# Patient Record
Sex: FEMALE | Race: WHITE
[De-identification: names, ages, dates, MRNs, and addresses within clinical notes are randomized per-mention and may not be internally consistent; named-entity substitution may affect disease eponyms.]

---

## 2018-12-04 ENCOUNTER — HOSPITAL ENCOUNTER (EMERGENCY)
Dept: HOSPITAL 31 - C.ER | Age: 68
Discharge: HOME | End: 2018-12-04
Payer: COMMERCIAL

## 2018-12-04 VITALS — HEART RATE: 98 BPM | SYSTOLIC BLOOD PRESSURE: 154 MMHG | TEMPERATURE: 98.9 F | DIASTOLIC BLOOD PRESSURE: 79 MMHG

## 2018-12-04 VITALS — OXYGEN SATURATION: 98 %

## 2018-12-04 VITALS — RESPIRATION RATE: 18 BRPM

## 2018-12-04 DIAGNOSIS — R19.00: Primary | ICD-10-CM

## 2018-12-04 DIAGNOSIS — Z87.442: ICD-10-CM

## 2018-12-04 DIAGNOSIS — R10.9: ICD-10-CM

## 2018-12-04 LAB
ANISOCYTOSIS BLD QL SMEAR: SLIGHT
BACTERIA #/AREA URNS HPF: (no result) /[HPF]
BASOPHILS # BLD AUTO: 0.1 K/UL (ref 0–0.2)
BASOPHILS NFR BLD: 1 % (ref 0–2)
BASOPHILS NFR BLD: 1 % (ref 0–2)
BILIRUB UR-MCNC: NEGATIVE MG/DL
BUN SERPL-MCNC: 15 MG/DL (ref 7–17)
CALCIUM SERPL-MCNC: 9.3 MG/DL (ref 8.6–10.4)
EOSINOPHIL # BLD AUTO: 0.3 K/UL (ref 0–0.7)
EOSINOPHIL NFR BLD: 3 % (ref 0–4)
ERYTHROCYTE [DISTWIDTH] IN BLOOD BY AUTOMATED COUNT: 15.6 % (ref 11.5–14.5)
GFR NON-AFRICAN AMERICAN: > 60
GLUCOSE UR STRIP-MCNC: NORMAL MG/DL
HGB BLD-MCNC: 11.7 G/DL (ref 11–16)
HYPOCHROMIC: SLIGHT
LEUKOCYTE ESTERASE UR-ACNC: (no result) LEU/UL
LG PLATELETS BLD QL SMEAR: PRESENT
LYMPHOCYTE: 8 % (ref 20–40)
LYMPHOCYTES # BLD AUTO: 1 K/UL (ref 1–4.3)
LYMPHOCYTES NFR BLD AUTO: 9.9 % (ref 20–40)
MCH RBC QN AUTO: 26.2 PG (ref 27–31)
MCHC RBC AUTO-ENTMCNC: 33.3 G/DL (ref 33–37)
MCV RBC AUTO: 78.6 FL (ref 81–99)
MONOCYTE: 7 % (ref 0–10)
MONOCYTES # BLD: 0.8 K/UL (ref 0–0.8)
MONOCYTES NFR BLD: 8.4 % (ref 0–10)
NEUTROPHILS # BLD: 7.8 K/UL (ref 1.8–7)
NEUTROPHILS NFR BLD AUTO: 77.7 % (ref 50–75)
NEUTROPHILS NFR BLD AUTO: 84 % (ref 50–75)
NRBC BLD AUTO-RTO: 0.1 % (ref 0–2)
OVALOCYTES BLD QL SMEAR: SLIGHT
PH UR STRIP: 5 [PH] (ref 5–8)
PLATELET # BLD EST: (no result) 10*3/UL
PLATELET # BLD: 425 K/UL (ref 130–400)
PMV BLD AUTO: 7.3 FL (ref 7.2–11.7)
POIKILOCYTOSIS BLD QL SMEAR: SLIGHT
PROT UR STRIP-MCNC: (no result) MG/DL
RBC # BLD AUTO: 4.45 MIL/UL (ref 3.8–5.2)
RBC # UR STRIP: (no result) /UL
SP GR UR STRIP: 1.02 (ref 1–1.03)
SQUAMOUS EPITHIAL: 4 /HPF (ref 0–5)
TOTAL CELLS COUNTED BLD: 100
UROBILINOGEN UR-MCNC: NORMAL MG/DL (ref 0.2–1)
WBC # BLD AUTO: 10.1 K/UL (ref 4.8–10.8)

## 2018-12-04 PROCEDURE — 74176 CT ABD & PELVIS W/O CONTRAST: CPT

## 2018-12-04 PROCEDURE — 96374 THER/PROPH/DIAG INJ IV PUSH: CPT

## 2018-12-04 PROCEDURE — 85025 COMPLETE CBC W/AUTO DIFF WBC: CPT

## 2018-12-04 PROCEDURE — 96361 HYDRATE IV INFUSION ADD-ON: CPT

## 2018-12-04 PROCEDURE — 80048 BASIC METABOLIC PNL TOTAL CA: CPT

## 2018-12-04 PROCEDURE — 99284 EMERGENCY DEPT VISIT MOD MDM: CPT

## 2018-12-04 PROCEDURE — 81001 URINALYSIS AUTO W/SCOPE: CPT

## 2018-12-04 NOTE — C.PDOC
History Of Present Illness


68-year-old female presents to the ED complaining of right flank pain since 

yesterday. She reports pain is similar to prior episode of kidney stones. Pain 

is described as constant and localized. She denies any dysuria, frequency, uri

nary incontinence, fever, or chills. Patient reports + nausea, but no vomiting.





Time Seen by Provider: 18 13:17


Chief Complaint (Nursing): Back Pain


History Per: Patient


History/Exam Limitations: no limitations


Onset/Duration Of Symptoms: Days


Current Symptoms Are (Timing): Still Present


Quality Of Discomfort: "Pain"


Associated Symptoms: Nausea


Alleviating Factors: None





Past Medical History


Reviewed: Historical Data, Nursing Documentation, Vital Signs





- Medical History


PMH: Kidney Stones


Surgical History: 


Family History: States: No Known Family Hx





Review Of Systems


Except As Marked, All Systems Reviewed And Found Negative.


Constitutional: Negative for: Fever, Chills


Gastrointestinal: Positive for: Nausea.  Negative for: Vomiting, Diarrhea


Genitourinary: Negative for: Dysuria, Frequency, Incontinence, Hematuria


Musculoskeletal: Positive for: Back Pain (right flank pain)


Neurological: Negative for: Weakness, Numbness, Incoordination





Physical Exam





- Physical Exam


Appears: Non-toxic, In Acute Distress (mild painful distress)


Skin: Warm, Dry


Head: Atraumatic, Normacephalic


Eye(s): bilateral: Normal Inspection, PERRL, EOMI


Oral Mucosa: Moist


Neck: Normal ROM


Chest: Symmetrical


Cardiovascular: Rhythm Regular, No Murmur


Respiratory: Normal Breath Sounds, No Accessory Muscle Use, Other (NARD)


Gastrointestinal/Abdominal: Soft, No Tenderness, No Distention, No Guarding, No 

Rebound


Back: No CVA Tenderness, No Vertebral Tenderness


Extremity: Bilateral: Atraumatic, Normal Color And Temperature, Normal ROM


Pulses: Left Radial: Normal, Right Radial: Normal


Neurological/Psych: Oriented x3, Normal Speech





ED Course And Treatment





- Laboratory Results


Result Diagrams: 


                                 18 13:22





                                 18 13:22


O2 Sat by Pulse Oximetry: 98 (RA)


Pulse Ox Interpretation: Normal





- CT Scan/US


  ** CT Abd/Pelvis


Other Rad Studies (CT/US): Read By Radiologist, Radiology Report Reviewed


CT/US Interpretation: Accession No. : [id number].  Patient Name / ID : 

CLIFFORD MALCOLM  / [id number].  Exam Date : 2018 13:57:36 ( Approved ).  Study 

Comment :  Sex / Age : F  / 68Y.  Creator : Velia Su MD.  Dictator :

Velia Su MD.   :  Approver : Velia Su MD.  

Approver2 :  Report Date : 2018 14:38:42.  My Comment :  

*************************************

**********************************************.  This report is currently 

processing and HAS NOT BEEN OFFICIALLY SIGNED BY THE PHYSICIAN - ESTIMATED TIME 

OF APPROVAL IS 2018 14:43.  PROCEDURE:  CT Abdomen and Pelvis without Oral

or IV contrast.  HISTORY:  R FLANK PAIN.  COMPARISON:  None available.  

TECHNIQUE:  Contiguous axial images of the abdomen and pelvis. No oral or IV 

contrast administered. Coronal and Sagittal reformats generated and reviewed.  

Radiation dose:  Total exam DLP = 424.29 mGy-cm.  This CT exam was performed 

using one or more of the following dose reduction techniques: Automated exposure

control, adjustment of the mA and/or kV according to patient size, and/or use of

iterative reconstruction technique.  FINDINGS:  There is limited evaluation of 

the solid organs without the administration of IV contrast.  LOWER THORAX:  No 

visible consolidation, pleural effusion, or pneumothorax.  10 mm and 6 mm right 

breast nodules.  LIVER:  Unremarkable unenhanced appearance.  GALLBLADDER AND 

BILE DUCTS:  Unremarkable unenhanced appearance.  PANCREAS:  Unremarkable 

unenhanced appearance.  SPLEEN:  Unremarkable unenhanced appearance.  ADRENALS: 

Unremarkable unenhanced appearance.  KIDNEYS AND URETERS:  No hydronephrosis or 

obstructing renal calculus. 1.7 cm right renal mass with fatty contents likely 

angiomyolipoma.  Punctate nonobstructing bilateral renal calculi.  BLADDER:  The

urinary bladder appears under distended.  REPRODUCTIVE:  Uterus is present.  

Large complex solid and cystic pelvic mass extending above the level of the 

umbilicus worrisome for malignant ovarian neoplasm.  APPENDIX:  Not visualized 

definitively.  BOWEL:  The stomach is nondistended.  Lack of oral contrast 

limits evaluation for bowel pathology.   The bowel loops appear within normal li

mits of caliber without evidence of intestinal obstruction.  PERITONEUM:  

Omental soft tissue deposits particularly within the left mid abdomen consistent

with omental metastases. No significant free fluid. No definite free air.  LYMPH

NODES:  No bulky lymphadenopathy identified.  VASCULATURE:  Mild atherosclerotic

calcifications. No aortic aneurysm.  BONES:  No acute osseous abnormality is 

detected.  OTHER FINDINGS:  None.  IMPRESSION:  Large complex solid and cystic 

pelvic mass which extends above the level the umbilicus worrisome for malignant 

ovarian neoplasm. If indicated, MRI of the pelvis with IV contrast may be 

considered for further assessment.  Evidence of omental metastases.  1.7 cm 

right renal mass with fatty contents, likely angiomyolipoma.  Upper abdominal 

ascites.  Small right breast soft tissue nodules uncertain significance. Suggest

correlation with dedicated breast imaging.  Findings discussed with Dr. Langley on 

18 at 2:27 p.m.





- Physician Consult Information


Time Consulting Physician Contacted: 14:38


Physician Contacted: Nimisha A Mina (pt can be discharged home with close 

follow up in the office)





Progress





- Data Reviewed


Data Reviewed: Lab, Diagnostic imaging





Medical Decision Making


Medical Decision Making: 





Impression: 69 y/o F with right flank pain and nausea, r/o kidney stone





Initial Plan:


--BMP


--CBC


--UA


--1 L IV fluids w/ Lidocaine 2% infusion


--Toradol 30 mg IM


--Tylenol 975 mg PO


--Tamsulosin 0.4 mg PO


--CT Abd/Pelvis without contrast 





CT findings discussed with patient. Plan is to d/c patient home with RX for 

percocet.


Counseled regarding diagnosis and the need for close follow up with GYN. 








Disposition


Counseled Patient/Family Regarding: Studies Performed, Diagnosis, Need For 

Followup, Rx Given





- Disposition


Referrals: 


YOUR,GYNECOLOGIST [Other]


Disposition: HOME/ ROUTINE


Disposition Time: 14:40


Condition: IMPROVED


Additional Instructions: 


FOLLOW UP WITH YOUR GYNECOLOGIST ASAP. RETURN IF WORSENING SYMPTOMS.


Prescriptions: 


oxyCODONE/Acetaminophen [Percocet 5/325 mg Tab] 1 ea PO Q6 PRN #10 tab


 PRN Reason: Pain, Moderate (4-7)


Forms:  [facility] (English)





- Clinical Impression


Clinical Impression: 


 Flank pain, Pelvic mass








- Scribe Statement


The provider has reviewed the documentation as recorded by the Jason Nicole





Provider Attestation: 


All medical record entries made by the Jason were at my direction and person

ally dictated by me. I have reviewed the chart and agree that the record 

accurately reflects my personal performance of the history, physical exam, 

medical decision making, and the department course for this patient. I have also

personally directed, reviewed, and agree with the discharge instructions and 

disposition.

## 2018-12-04 NOTE — CT
PROCEDURE:  CT Abdomen and Pelvis without Oral or IV contrast.



HISTORY:

R FLANK PAIN



COMPARISON:

None available.



TECHNIQUE:

Contiguous axial images of the abdomen and pelvis. No oral or IV 

contrast administered. Coronal and Sagittal reformats generated and 

reviewed. 



Radiation dose:



Total exam DLP = 424.29 mGy-cm.



This CT exam was performed using one or more of the following dose 

reduction techniques: Automated exposure control, adjustment of the 

mA and/or kV according to patient size, and/or use of iterative 

reconstruction technique.



FINDINGS:

There is limited evaluation of the solid organs without the 

administration of IV contrast.



LOWER THORAX:

No visible consolidation, pleural effusion, or pneumothorax. 



10 mm and 6 mm right breast nodules. 



LIVER:

Unremarkable unenhanced appearance. 



GALLBLADDER AND BILE DUCTS:

Unremarkable unenhanced appearance. 



PANCREAS:

Unremarkable unenhanced appearance. 



SPLEEN:

Unremarkable unenhanced appearance. 



ADRENALS:

Unremarkable unenhanced appearance. 



KIDNEYS AND URETERS:

No hydronephrosis or obstructing renal calculus. 1.7 cm right renal 

mass with fatty contents likely angiomyolipoma.  Punctate 

nonobstructing bilateral renal calculi. 



BLADDER:

The urinary bladder appears under distended. 



REPRODUCTIVE:

Uterus is present.  Large complex solid and cystic pelvic mass 

extending above the level of the umbilicus worrisome for malignant 

ovarian neoplasm. 



APPENDIX:

Not visualized definitively. 



BOWEL:

The stomach is nondistended. 



Lack of oral contrast limits evaluation for bowel pathology.   The 

bowel loops appear within normal limits of caliber without evidence 

of intestinal obstruction.



PERITONEUM:

Omental soft tissue deposits particularly within the left mid abdomen 

consistent with omental metastases. No significant free fluid. No 

definite free air.



LYMPH NODES:

No bulky lymphadenopathy identified.



VASCULATURE:

Mild atherosclerotic calcifications. No aortic aneurysm. 



BONES:

No acute osseous abnormality is detected.



OTHER FINDINGS:

None. 



IMPRESSION:

Large complex solid and cystic pelvic mass which extends above the 

level the umbilicus worrisome for malignant ovarian neoplasm. If 

indicated, MRI of the pelvis with IV contrast may be considered for 

further assessment. 



Evidence of omental metastases. 



1.7 cm right renal mass with fatty contents, likely angiomyolipoma. 



Upper abdominal ascites. 



Small right breast soft tissue nodules uncertain significance. 

Suggest correlation with dedicated breast imaging. 



Findings discussed with Dr. Langley on 12/4/18 at 2:27 p.m.